# Patient Record
Sex: MALE | Race: WHITE | NOT HISPANIC OR LATINO | Employment: OTHER | URBAN - METROPOLITAN AREA
[De-identification: names, ages, dates, MRNs, and addresses within clinical notes are randomized per-mention and may not be internally consistent; named-entity substitution may affect disease eponyms.]

---

## 2018-01-24 ENCOUNTER — ESTABLISHED COMPREHENSIVE EXAM (OUTPATIENT)
Dept: URBAN - METROPOLITAN AREA CLINIC 35 | Facility: CLINIC | Age: 68
End: 2018-01-24

## 2018-01-24 DIAGNOSIS — H26.491: ICD-10-CM

## 2018-01-24 DIAGNOSIS — H43.813: ICD-10-CM

## 2018-01-24 DIAGNOSIS — H26.492: ICD-10-CM

## 2018-01-24 PROCEDURE — 1036F TOBACCO NON-USER: CPT

## 2018-01-24 PROCEDURE — G8427 DOCREV CUR MEDS BY ELIG CLIN: HCPCS

## 2018-01-24 PROCEDURE — G8785 BP SCRN NO PERF AT INTERVAL: HCPCS

## 2018-01-24 PROCEDURE — 92014 COMPRE OPH EXAM EST PT 1/>: CPT

## 2018-01-24 PROCEDURE — 92015 DETERMINE REFRACTIVE STATE: CPT

## 2018-01-24 ASSESSMENT — VISUAL ACUITY
OS_CC: 20/40
OD_CC: J2
OS_SC: J10
OS_SC: 20/40
OS_BAT: 20/<400 W/ MR
OD_BAT: 20/70 W/ MR
OD_SC: 20/25-1
OS_CC: J5
OD_CC: 20/30-2

## 2018-01-24 ASSESSMENT — TONOMETRY
OS_IOP_MMHG: 14
OD_IOP_MMHG: 14

## 2018-02-15 ENCOUNTER — SURGERY/PROCEDURE (OUTPATIENT)
Dept: URBAN - METROPOLITAN AREA SURGERY 14 | Facility: SURGERY | Age: 68
End: 2018-02-15

## 2018-02-15 DIAGNOSIS — H26.492: ICD-10-CM

## 2018-02-15 PROCEDURE — 66821 AFTER CATARACT LASER SURGERY: CPT

## 2018-02-22 ENCOUNTER — FOLLOW UP (OUTPATIENT)
Dept: URBAN - METROPOLITAN AREA CLINIC 39 | Facility: CLINIC | Age: 68
End: 2018-02-22

## 2018-02-22 ENCOUNTER — SURGERY/PROCEDURE (OUTPATIENT)
Dept: URBAN - METROPOLITAN AREA SURGERY 14 | Facility: SURGERY | Age: 68
End: 2018-02-22

## 2018-02-22 DIAGNOSIS — H26.491: ICD-10-CM

## 2018-02-22 PROCEDURE — 4040F PNEUMOC VAC/ADMIN/RCVD: CPT

## 2018-02-22 PROCEDURE — 99213 OFFICE O/P EST LOW 20 MIN: CPT

## 2018-02-22 PROCEDURE — 66821 AFTER CATARACT LASER SURGERY: CPT

## 2018-02-22 PROCEDURE — G8785 BP SCRN NO PERF AT INTERVAL: HCPCS

## 2018-02-22 PROCEDURE — 1036F TOBACCO NON-USER: CPT

## 2018-02-22 PROCEDURE — G8482 FLU IMMUNIZE ORDER/ADMIN: HCPCS

## 2018-02-22 PROCEDURE — G8427 DOCREV CUR MEDS BY ELIG CLIN: HCPCS

## 2018-02-22 ASSESSMENT — TONOMETRY
OS_IOP_MMHG: 12
OD_IOP_MMHG: 12

## 2018-02-22 ASSESSMENT — VISUAL ACUITY
OD_SC: 20/25-1
OD_BAT: 20/70 WITH MR
OS_SC: 20/30

## 2018-02-28 ENCOUNTER — POST-OP (OUTPATIENT)
Dept: URBAN - METROPOLITAN AREA CLINIC 35 | Facility: CLINIC | Age: 68
End: 2018-02-28

## 2018-02-28 DIAGNOSIS — Z98.890: ICD-10-CM

## 2018-02-28 ASSESSMENT — VISUAL ACUITY
OD_SC: 20/20-2
OS_CC: 20/30+2
OS_SC: 20/25
OD_CC: 20/25-2

## 2018-02-28 ASSESSMENT — TONOMETRY
OS_IOP_MMHG: 21
OD_IOP_MMHG: 15

## 2018-09-24 NOTE — PATIENT DISCUSSION
Artificial Tears: One drop to both eyes 3-4 times daily. We recommend Systane or Refresh lubricating eye drops which can be found at any pharmacy. Add Avenova lid  bid OU.

## 2019-02-27 ENCOUNTER — ESTABLISHED COMPREHENSIVE EXAM (OUTPATIENT)
Dept: URBAN - METROPOLITAN AREA CLINIC 35 | Facility: CLINIC | Age: 69
End: 2019-02-27

## 2019-02-27 DIAGNOSIS — H43.813: ICD-10-CM

## 2019-02-27 DIAGNOSIS — Z98.890: ICD-10-CM

## 2019-02-27 PROCEDURE — 92015 DETERMINE REFRACTIVE STATE: CPT

## 2019-02-27 PROCEDURE — 92014 COMPRE OPH EXAM EST PT 1/>: CPT

## 2019-02-27 ASSESSMENT — VISUAL ACUITY
OD_SC: 20/30
OS_SC: 20/40
OS_CC: 20/30
OD_CC: J2
OD_CC: 20/25
OS_CC: J2

## 2019-02-27 ASSESSMENT — TONOMETRY
OS_IOP_MMHG: 11
OD_IOP_MMHG: 11

## 2020-03-06 ENCOUNTER — ESTABLISHED COMPREHENSIVE EXAM (OUTPATIENT)
Dept: URBAN - METROPOLITAN AREA CLINIC 35 | Facility: CLINIC | Age: 70
End: 2020-03-06

## 2020-03-06 DIAGNOSIS — H43.813: ICD-10-CM

## 2020-03-06 PROCEDURE — 92014 COMPRE OPH EXAM EST PT 1/>: CPT

## 2020-03-06 ASSESSMENT — VISUAL ACUITY
OD_CC: J3
OS_CC: J4+
OS_SC: 20/20-
OD_SC: 20/25+-

## 2020-03-06 ASSESSMENT — TONOMETRY
OS_IOP_MMHG: 12
OD_IOP_MMHG: 12

## 2021-03-08 ENCOUNTER — ESTABLISHED COMPREHENSIVE EXAM (OUTPATIENT)
Dept: URBAN - METROPOLITAN AREA CLINIC 35 | Facility: CLINIC | Age: 71
End: 2021-03-08

## 2021-03-08 DIAGNOSIS — H43.813: ICD-10-CM

## 2021-03-08 PROCEDURE — 92014 COMPRE OPH EXAM EST PT 1/>: CPT

## 2021-03-08 PROCEDURE — 92015 DETERMINE REFRACTIVE STATE: CPT

## 2021-03-08 ASSESSMENT — VISUAL ACUITY
OS_CC: J1
OD_CC: J1
OD_SC: 20/25
OS_CC: 20/20
OS_SC: 20/25
OD_CC: 20/20-1

## 2021-03-08 ASSESSMENT — TONOMETRY
OD_IOP_MMHG: 13
OS_IOP_MMHG: 13

## 2022-03-14 ENCOUNTER — ESTABLISHED PATIENT (OUTPATIENT)
Dept: URBAN - METROPOLITAN AREA CLINIC 35 | Facility: CLINIC | Age: 72
End: 2022-03-14

## 2022-03-14 DIAGNOSIS — H52.7: ICD-10-CM

## 2022-03-14 DIAGNOSIS — H43.813: ICD-10-CM

## 2022-03-14 PROCEDURE — 92014 COMPRE OPH EXAM EST PT 1/>: CPT

## 2022-03-14 PROCEDURE — 92015 DETERMINE REFRACTIVE STATE: CPT

## 2022-03-14 ASSESSMENT — VISUAL ACUITY
OS_SC: 20/40+1
OD_CC: J1
OD_SC: J12
OD_SC: 20/25-2
OS_CC: 20/25-1
OS_CC: J2
OD_CC: 20/25-1
OS_SC: J12

## 2022-03-14 ASSESSMENT — TONOMETRY
OS_IOP_MMHG: 14
OD_IOP_MMHG: 14

## 2023-05-03 ENCOUNTER — ESTABLISHED PATIENT (OUTPATIENT)
Dept: URBAN - METROPOLITAN AREA CLINIC 35 | Facility: CLINIC | Age: 73
End: 2023-05-03

## 2023-05-03 DIAGNOSIS — H52.7: ICD-10-CM

## 2023-05-03 DIAGNOSIS — H43.813: ICD-10-CM

## 2023-05-03 PROCEDURE — 92014 COMPRE OPH EXAM EST PT 1/>: CPT

## 2023-05-03 PROCEDURE — 92015 DETERMINE REFRACTIVE STATE: CPT

## 2023-05-03 ASSESSMENT — VISUAL ACUITY
OS_SC: J12
OS_CC: 20/20-2
OU_CC: J1
OU_CC: 20/20-1
OS_SC: 20/25-1
OD_CC: 20/20-1
OU_SC: J8
OD_SC: 20/20-1
OU_SC: 20/25
OD_SC: J10
OS_CC: J2

## 2023-05-03 ASSESSMENT — TONOMETRY
OD_IOP_MMHG: 13
OS_IOP_MMHG: 13

## 2024-05-08 ENCOUNTER — PREPPED CHART (OUTPATIENT)
Dept: URBAN - METROPOLITAN AREA CLINIC 35 | Facility: CLINIC | Age: 74
End: 2024-05-08